# Patient Record
Sex: FEMALE | Race: WHITE | NOT HISPANIC OR LATINO | URBAN - NONMETROPOLITAN AREA
[De-identification: names, ages, dates, MRNs, and addresses within clinical notes are randomized per-mention and may not be internally consistent; named-entity substitution may affect disease eponyms.]

---

## 2019-04-04 ENCOUNTER — IMPORTED ENCOUNTER (OUTPATIENT)
Dept: URBAN - NONMETROPOLITAN AREA CLINIC 1 | Facility: CLINIC | Age: 59
End: 2019-04-04

## 2019-04-04 PROBLEM — H52.4: Noted: 2019-04-04

## 2019-04-04 PROBLEM — H52.13: Noted: 2019-04-04

## 2019-04-04 PROBLEM — H43.813: Noted: 2019-04-04

## 2019-04-04 PROBLEM — H52.223: Noted: 2019-04-04

## 2019-04-04 PROBLEM — H16.223: Noted: 2019-04-04

## 2019-04-04 PROBLEM — H25.13: Noted: 2019-04-04

## 2019-04-04 PROCEDURE — 92014 COMPRE OPH EXAM EST PT 1/>: CPT

## 2019-04-04 PROCEDURE — 92015 DETERMINE REFRACTIVE STATE: CPT

## 2019-04-04 NOTE — PATIENT DISCUSSION
Nuclear Sclerosis OU -  discussed findings w/patient-  no treatment indicated at this time-  UV protection recommended-  RTC 1 year or prnPVD OU-  discussed findings w/patient-  Retina flat 360 with no breaks tears or heme. -  S&S of RD/RT reviewed with pt. -  Stressed that pt should contact our office right away with any changes or increase in symptoms.-  RTC 1 year or prnDES OU-  discussed findings w/patient-  continue AT's PRN OU-  RTC 1 year or prnCompound Myopic Astigmatism OU w/Presbyopia-  discussed findings w/patient-  new spectacle Rx issued-  RTC 1 year or prn; 's Notes: MR 4/4/2019DFE 4/4/2019

## 2020-09-09 ENCOUNTER — IMPORTED ENCOUNTER (OUTPATIENT)
Dept: URBAN - NONMETROPOLITAN AREA CLINIC 1 | Facility: CLINIC | Age: 60
End: 2020-09-09

## 2020-09-09 PROCEDURE — 92015 DETERMINE REFRACTIVE STATE: CPT

## 2020-09-09 PROCEDURE — 92014 COMPRE OPH EXAM EST PT 1/>: CPT

## 2020-09-09 NOTE — PATIENT DISCUSSION
Nuclear Sclerosis OU -  discussed findings w/patient-  no treatment indicated at this time-  UV protection recommended-  RTC 1 year or prnPVD OU-  discussed findings w/patient-  Retina flat 360 with no breaks tears or heme. -  S&S of RD/RT reviewed with pt. -  Stressed that pt should contact our office right away with any changes or increase in symptoms.-  RTC 1 year or prnDES OU-  discussed findings w/patient-  no changes noted at this time-  continue Fish Oil -  continue AT's PRN OU-  RTC 1 year or prnCompound Myopic Astigmatism OU w/Presbyopia-  discussed findings w/patient-  new spectacle Rx issued-  RTC 1 year or prn; 's Notes: MR 9/9/20DFE  9/9/20

## 2021-10-11 ENCOUNTER — IMPORTED ENCOUNTER (OUTPATIENT)
Dept: URBAN - NONMETROPOLITAN AREA CLINIC 1 | Facility: CLINIC | Age: 61
End: 2021-10-11

## 2021-10-11 PROBLEM — H43.813: Noted: 2021-10-11

## 2021-10-11 PROBLEM — H16.223: Noted: 2021-10-11

## 2021-10-11 PROBLEM — H52.4: Noted: 2021-10-11

## 2021-10-11 PROBLEM — H25.13: Noted: 2021-10-11

## 2021-10-11 PROBLEM — H52.223: Noted: 2021-10-11

## 2021-10-11 PROBLEM — H52.13: Noted: 2021-10-11

## 2021-10-11 PROCEDURE — 92015 DETERMINE REFRACTIVE STATE: CPT

## 2021-10-11 PROCEDURE — 92014 COMPRE OPH EXAM EST PT 1/>: CPT

## 2021-10-11 NOTE — PATIENT DISCUSSION
Compound Myopic Astigmatism OU w/Presbyopia-  discussed findings w/patient-  new spectacle Rx issued-  RTC 1 year or prnNuclear Sclerosis OU -  discussed findings w/patient-  no treatment indicated at this time-  UV protection recommended-  RTC 1 year or prnPVD OU-  discussed findings w/patient-  Retina flat 360 with no breaks tears or heme. -  S&S of RD/RT reviewed with pt. -  Stressed that pt should contact our office right away with any changes or increase in symptoms.-  RTC 1 year or prnDES OU-  discussed findings w/patient-  no changes noted at this time-  continue Fish Oil -  continue AT's PRN OU-  RTC 1 year or prn; 's Notes: MR 10/11/2021DFE 10/11/2021

## 2022-04-09 ASSESSMENT — TONOMETRY
OD_IOP_MMHG: 14
OS_IOP_MMHG: 16
OS_IOP_MMHG: 18
OS_IOP_MMHG: 14
OD_IOP_MMHG: 15
OD_IOP_MMHG: 18

## 2022-04-09 ASSESSMENT — VISUAL ACUITY
OS_SC: 20/30
OS_SC: 20/30
OD_SC: 20/30+
OU_CC: J1+
OD_SC: 20/30
OD_GLARE: 20/30
OD_SC: 20/25-2
OU_SC: 20/25
OS_SC: 20/30

## 2023-05-22 ENCOUNTER — ESTABLISHED PATIENT (OUTPATIENT)
Dept: RURAL CLINIC 2 | Facility: CLINIC | Age: 63
End: 2023-05-22

## 2023-05-22 DIAGNOSIS — H52.4: ICD-10-CM

## 2023-05-22 DIAGNOSIS — H43.813: ICD-10-CM

## 2023-05-22 DIAGNOSIS — H52.223: ICD-10-CM

## 2023-05-22 DIAGNOSIS — H52.13: ICD-10-CM

## 2023-05-22 DIAGNOSIS — H25.813: ICD-10-CM

## 2023-05-22 DIAGNOSIS — H16.223: ICD-10-CM

## 2023-05-22 PROCEDURE — 92014 COMPRE OPH EXAM EST PT 1/>: CPT

## 2023-05-22 PROCEDURE — 92015 DETERMINE REFRACTIVE STATE: CPT

## 2023-05-22 ASSESSMENT — TONOMETRY
OS_IOP_MMHG: 16
OD_IOP_MMHG: 18

## 2023-05-22 ASSESSMENT — VISUAL ACUITY
OS_CC: 20/30
OD_CC: 20/30

## 2023-08-11 ENCOUNTER — FOLLOW UP (OUTPATIENT)
Dept: URBAN - NONMETROPOLITAN AREA CLINIC 4 | Facility: CLINIC | Age: 63
End: 2023-08-11

## 2023-08-11 DIAGNOSIS — H43.813: ICD-10-CM

## 2023-08-11 DIAGNOSIS — H16.223: ICD-10-CM

## 2023-08-11 DIAGNOSIS — H25.813: ICD-10-CM

## 2023-08-11 PROCEDURE — 99213 OFFICE O/P EST LOW 20 MIN: CPT

## 2023-08-11 ASSESSMENT — VISUAL ACUITY
OS_CC: 20/30
OD_CC: 20/20

## 2023-08-11 ASSESSMENT — TONOMETRY
OS_IOP_MMHG: 16
OD_IOP_MMHG: 16

## 2024-08-16 ENCOUNTER — COMPREHENSIVE EXAM (OUTPATIENT)
Dept: URBAN - NONMETROPOLITAN AREA CLINIC 4 | Facility: CLINIC | Age: 64
End: 2024-08-16

## 2024-08-16 DIAGNOSIS — H43.813: ICD-10-CM

## 2024-08-16 DIAGNOSIS — H52.4: ICD-10-CM

## 2024-08-16 DIAGNOSIS — H25.813: ICD-10-CM

## 2024-08-16 DIAGNOSIS — H52.13: ICD-10-CM

## 2024-08-16 DIAGNOSIS — H16.223: ICD-10-CM

## 2024-08-16 PROCEDURE — 92134 CPTRZ OPH DX IMG PST SGM RTA: CPT

## 2024-08-16 PROCEDURE — 99214 OFFICE O/P EST MOD 30 MIN: CPT

## 2024-08-16 PROCEDURE — 92015 DETERMINE REFRACTIVE STATE: CPT

## 2024-08-16 ASSESSMENT — TONOMETRY
OD_IOP_MMHG: 15
OS_IOP_MMHG: 15

## 2024-08-16 ASSESSMENT — VISUAL ACUITY
OD_CC: 20/20
OS_CC: 20/25-1
OS_BAT: 20/40